# Patient Record
Sex: FEMALE | Race: WHITE
[De-identification: names, ages, dates, MRNs, and addresses within clinical notes are randomized per-mention and may not be internally consistent; named-entity substitution may affect disease eponyms.]

---

## 2018-12-13 ENCOUNTER — HOSPITAL ENCOUNTER (EMERGENCY)
Dept: HOSPITAL 62 - ER | Age: 48
LOS: 1 days | Discharge: HOME | End: 2018-12-14
Payer: MEDICARE

## 2018-12-13 VITALS — SYSTOLIC BLOOD PRESSURE: 121 MMHG | DIASTOLIC BLOOD PRESSURE: 73 MMHG

## 2018-12-13 DIAGNOSIS — Z88.6: ICD-10-CM

## 2018-12-13 DIAGNOSIS — N93.9: ICD-10-CM

## 2018-12-13 DIAGNOSIS — Z88.5: ICD-10-CM

## 2018-12-13 DIAGNOSIS — R10.9: ICD-10-CM

## 2018-12-13 DIAGNOSIS — Z88.0: ICD-10-CM

## 2018-12-13 DIAGNOSIS — Z90.49: ICD-10-CM

## 2018-12-13 DIAGNOSIS — K59.00: Primary | ICD-10-CM

## 2018-12-13 DIAGNOSIS — J45.909: ICD-10-CM

## 2018-12-13 DIAGNOSIS — I10: ICD-10-CM

## 2018-12-13 PROCEDURE — 85025 COMPLETE CBC W/AUTO DIFF WBC: CPT

## 2018-12-13 PROCEDURE — 83690 ASSAY OF LIPASE: CPT

## 2018-12-13 PROCEDURE — 99283 EMERGENCY DEPT VISIT LOW MDM: CPT

## 2018-12-13 PROCEDURE — 84702 CHORIONIC GONADOTROPIN TEST: CPT

## 2018-12-13 PROCEDURE — 80053 COMPREHEN METABOLIC PANEL: CPT

## 2018-12-13 PROCEDURE — 81001 URINALYSIS AUTO W/SCOPE: CPT

## 2018-12-13 PROCEDURE — 36415 COLL VENOUS BLD VENIPUNCTURE: CPT

## 2018-12-14 LAB
ADD MANUAL DIFF: NO
ALBUMIN SERPL-MCNC: 4.2 G/DL (ref 3.5–5)
ALP SERPL-CCNC: 70 U/L (ref 38–126)
ALT SERPL-CCNC: 29 U/L (ref 9–52)
ANION GAP SERPL CALC-SCNC: 11 MMOL/L (ref 5–19)
APPEARANCE UR: (no result)
APTT PPP: YELLOW S
AST SERPL-CCNC: 23 U/L (ref 14–36)
BASOPHILS # BLD AUTO: 0.1 10^3/UL (ref 0–0.2)
BASOPHILS NFR BLD AUTO: 1.5 % (ref 0–2)
BILIRUB DIRECT SERPL-MCNC: 0.2 MG/DL (ref 0–0.4)
BILIRUB SERPL-MCNC: 0.8 MG/DL (ref 0.2–1.3)
BILIRUB UR QL STRIP: NEGATIVE
BUN SERPL-MCNC: 16 MG/DL (ref 7–20)
CALCIUM: 9.7 MG/DL (ref 8.4–10.2)
CHLORIDE SERPL-SCNC: 104 MMOL/L (ref 98–107)
CO2 SERPL-SCNC: 28 MMOL/L (ref 22–30)
EOSINOPHIL # BLD AUTO: 0.5 10^3/UL (ref 0–0.6)
EOSINOPHIL NFR BLD AUTO: 6.5 % (ref 0–6)
ERYTHROCYTE [DISTWIDTH] IN BLOOD BY AUTOMATED COUNT: 13.9 % (ref 11.5–14)
GLUCOSE SERPL-MCNC: 95 MG/DL (ref 75–110)
GLUCOSE UR STRIP-MCNC: NEGATIVE MG/DL
HCT VFR BLD CALC: 41.7 % (ref 36–47)
HGB BLD-MCNC: 14.2 G/DL (ref 12–15.5)
KETONES UR STRIP-MCNC: NEGATIVE MG/DL
LIPASE SERPL-CCNC: 58.7 U/L (ref 23–300)
LYMPHOCYTES # BLD AUTO: 2 10^3/UL (ref 0.5–4.7)
LYMPHOCYTES NFR BLD AUTO: 25.9 % (ref 13–45)
MCH RBC QN AUTO: 30.7 PG (ref 27–33.4)
MCHC RBC AUTO-ENTMCNC: 34 G/DL (ref 32–36)
MCV RBC AUTO: 91 FL (ref 80–97)
MONOCYTES # BLD AUTO: 0.5 10^3/UL (ref 0.1–1.4)
MONOCYTES NFR BLD AUTO: 6 % (ref 3–13)
NEUTROPHILS # BLD AUTO: 4.7 10^3/UL (ref 1.7–8.2)
NEUTS SEG NFR BLD AUTO: 60.1 % (ref 42–78)
NITRITE UR QL STRIP: NEGATIVE
PH UR STRIP: 5 [PH] (ref 5–9)
PLATELET # BLD: 323 10^3/UL (ref 150–450)
POTASSIUM SERPL-SCNC: 4.3 MMOL/L (ref 3.6–5)
PROT SERPL-MCNC: 6.6 G/DL (ref 6.3–8.2)
PROT UR STRIP-MCNC: NEGATIVE MG/DL
RBC # BLD AUTO: 4.61 10^6/UL (ref 3.72–5.28)
SODIUM SERPL-SCNC: 142.5 MMOL/L (ref 137–145)
SP GR UR STRIP: 1.03
TOTAL CELLS COUNTED % (AUTO): 100 %
UROBILINOGEN UR-MCNC: NEGATIVE MG/DL (ref ?–2)
WBC # BLD AUTO: 7.8 10^3/UL (ref 4–10.5)

## 2018-12-14 NOTE — ER DOCUMENT REPORT
ED General





- General


Chief Complaint: Back Pain


Stated Complaint: BACK PAIN


Time Seen by Provider: 18 00:41


Notes: 


Patient is a 48-year-old female that comes to the Emergency Department for 

chief complaint of flank pain, discomfort and swelling of the abdomen, no 

menstrual cycle for around 2 months or so. Both positive and negative pregnancy 

test at home.  She is sexually active.  Denies vaginal spotting.  Denies 

vaginal bleeding or discharge, denies dysuria.  Denies fever, nausea or 

vomiting.  Past medical history of , hypertension, asthma, anxiety/

depression.


TRAVEL OUTSIDE OF THE [place] IN LAST 30 DAYS: No





- Related Data


Allergies/Adverse Reactions: 


 





aspirin [Aspirin] Allergy (Unknown, Verified 12 15:04)


 


codeine [Codeine] Allergy (Unknown, Verified 12 15:04)


 


hydrocodone [Hydrocodone] Allergy (Unknown, Verified 12 15:04)


 


ibuprofen [Ibuprofen] Allergy (Unknown, Verified 12 15:04)


 


morphine [Morphine] Allergy (Unknown, Verified 12 15:04)


 


Penicillins Allergy (Unknown, Verified 12 15:04)


 











Past Medical History





- General


Information source: Patient





- Social History


Smoking Status: Never Smoker


Drug Abuse: None


Lives with: Friend


Family History: Reviewed & Not Pertinent





- Past Medical History


Cardiac Medical History: Reports: Hx Hypertension


Pulmonary Medical History: Reports: Hx Asthma


GI Medical History: Reports: Hx Irritable Bowel


Psychiatric Medical History: Reports: Hx Anxiety, Hx Depression


Past Surgical History: Reports: Hx  Section - x3, Hx Cholecystectomy





- Immunizations


Hx Diphtheria, Pertussis, Tetanus Vaccination: Yes





Review of Systems





- Review of Systems


Constitutional: No symptoms reported


EENT: No symptoms reported


Cardiovascular: No symptoms reported


Respiratory: No symptoms reported


Gastrointestinal: See HPI


Genitourinary: See HPI


Female Genitourinary: See HPI


Musculoskeletal: No symptoms reported


Skin: No symptoms reported


Hematologic/Lymphatic: No symptoms reported


Neurological/Psychological: No symptoms reported





Physical Exam





- Vital signs


Vitals: 


 











Temp Pulse Resp BP Pulse Ox


 


 98.5 F   68   16   121/73   98 


 


 18 22:38  18 22:38  18 22:38  18 22:38  18 22:38














- Notes


Notes: 





GENERAL: Alert. No acute distress.


HEAD: Normocephalic, atraumatic.


EYES: Pupils equal, round, and reactive to light. Extraocular movements intact.


ENT: Oral mucosa moist, tongue midline. Oropharynx unremarkable. Airway patent. 

Nares patent, no nasal septal hematoma, TM's intact.


NECK: Full range of motion. Supple. Trachea midline.


LUNGS: Clear to auscultation bilaterally, no wheezes, rales, or rhonchi. No 

respiratory distress.


HEART: Regular rate and rhythm. No murmur


ABDOMEN: Soft, non-tender. Non-distended. Bowel sounds present in all 4 

quadrants.


GENITOURINARY: Deferred


EXTREMITIES: Moves all 4 extremities spontaneously. No edema, normal radial and 

dorsalis pedis pulses bilaterally. No cyanosis.


BACK: no cervical, thoracic, lumbar midline tenderness. No saddle anesthesia, 

normal distal neurovascular exam. 


NEUROLOGICAL: Alert and oriented x3. Normal speech. [cranial nerves II through 

XII grossly intact]. 


PSYCH: Normal affect, normal mood.


SKIN: Warm, dry, normal turgor. No rashes or lesions noted.





Course





- Re-evaluation


Re-evalutation: 


Patient is alert, talkative, well-appearing.  Vital signs unremarkable.





Nontender abdomen with questionable distention.  No signs of acute abdomen on 

evaluation.  CBC unremarkable, chemistry unremarkable, urinalysis unremarkable, 

patient requesting pregnancy test and this was negative.





I discussed the patient at length.  Recommended ultrasound, she states that she 

wants referral to OB/GYN to have this performed along with endometrial biopsy 

after discussion of her intermittent vaginal bleeding of undetermined source 

along with the importance to follow-up for ruling out potential precancerous 

cells.  Patient states she has trouble with bowel movements, is frequently 

constipated including now, as a result she was provided with medications for 

this.  I discussed follow-up and return precautions in detail, patient states 

she will follow-up closely for evaluation by OB/GYN and return if she worsens.





- Vital Signs


Vital signs: 


 











Temp Pulse Resp BP Pulse Ox


 


 98.5 F   68   16   121/73   98 


 


 18 22:38  18 22:38  18 22:38  18 22:38  18 22:38














- Laboratory


Result Diagrams: 


 18 01:40





 18 01:40


Laboratory results interpreted by me: 


 











  18





  01:40


 


Eosinophils %  6.5 H














Discharge





- Discharge


Clinical Impression: 


 Vaginal bleeding, Flank pain





Condition: Stable


Disposition: HOME, SELF-CARE


Additional Instructions: 


Your evaluation is does not show any concerning problems at this time.





Because of your irregular vaginal bleeding symptoms I highly recommend that you 

follow-up closely with the OB/GYN referral for additional evaluation and 

management including possible endometrial biopsy to rule out abnormal cells 

that could develop into cancer.





Your symptoms suggest that you have constipation causing abdominal bloating, I 

recommend that you drink about 1/3 of the magnesium citrate to help loosen your 

bowels, you can drink the rest every 4-6 hours in increments if needed.  He 

also been provided with a stool softener to take for the next week or so.  

Increase fiber in your diet and stay hydrated.





Return for any concerning symptoms including severe pain, fever, vomiting, 

passing out, or any other concerning symptoms.


Prescriptions: 


Docusate Sodium [Colace 100 mg Capsule] 100 mg PO DAILY #30 capsule


Referrals: 


CRISTOFER SHIN MD [ACTIVE STAFF] - Follow up tomorrow

## 2018-12-14 NOTE — ER DOCUMENT REPORT
ED Medical Screen (RME)





- General


Chief Complaint: Back Pain


Stated Complaint: BACK PAIN


Time Seen by Provider: 18 00:41


Notes: 





48-year-old female, chief complaint of flank pain, discomfort and swelling of 

the abdomen, no menstrual cycle for around 2 months or so.  Both positive and 

negative pregnancy test at home.  Denies vaginal spotting.  Denies fever, 

nausea or vomiting.  


TRAVEL OUTSIDE OF THE [place] IN LAST 30 DAYS: No





- Related Data


Allergies/Adverse Reactions: 


 





aspirin [Aspirin] Allergy (Unknown, Verified 12 15:04)


 


codeine [Codeine] Allergy (Unknown, Verified 12 15:04)


 


hydrocodone [Hydrocodone] Allergy (Unknown, Verified 12 15:04)


 


ibuprofen [Ibuprofen] Allergy (Unknown, Verified 12 15:04)


 


morphine [Morphine] Allergy (Unknown, Verified 12 15:04)


 


Penicillins Allergy (Unknown, Verified 12 15:04)


 











Past Medical History





- Past Medical History


Cardiac Medical History: Reports: Hx Hypertension


Pulmonary Medical History: Reports: Hx Asthma


GI Medical History: Reports: Hx Irritable Bowel


Psychiatric Medical History: Reports: Hx Anxiety, Hx Depression


Past Surgical History: Reports: Hx  Section - x3, Hx Cholecystectomy





- Immunizations


Hx Diphtheria, Pertussis, Tetanus Vaccination: Yes





Physical Exam





- Vital signs


Vitals: 





 











Temp Pulse Resp BP Pulse Ox


 


 98.5 F   68   16   121/73   98 


 


 18 22:38  18 22:38  18 22:38  18 22:38  18 22:38














- Abdominal


Inspection: Normal


Distension: Distended


Tenderness: Nontender.  No: Tender





Course





- Vital Signs


Vital signs: 





 











Temp Pulse Resp BP Pulse Ox


 


 98.5 F   68   16   121/73   98 


 


 18 22:38  18 22:38  18 22:38  18 22:38  18 22:38














Doctor's Discharge





- Discharge


Referrals: 


CECELIA CHAUDHRY MD [Primary Care Provider] - Follow up as needed